# Patient Record
Sex: MALE | URBAN - METROPOLITAN AREA
[De-identification: names, ages, dates, MRNs, and addresses within clinical notes are randomized per-mention and may not be internally consistent; named-entity substitution may affect disease eponyms.]

---

## 2023-06-22 ENCOUNTER — OFFICE VISIT (OUTPATIENT)
Dept: URBAN - METROPOLITAN AREA CLINIC 26 | Facility: CLINIC | Age: 33
End: 2023-06-22
Payer: COMMERCIAL

## 2023-06-22 DIAGNOSIS — D23.10 OTHER BENIGN NEOPLASM OF SKIN OF UNSPECIFIED EYELID, INCLUDING CANTHUS: Primary | ICD-10-CM

## 2023-06-22 PROCEDURE — 92004 COMPRE OPH EXAM NEW PT 1/>: CPT | Performed by: OPTOMETRIST

## 2023-06-22 PROCEDURE — 92134 CPTRZ OPH DX IMG PST SGM RTA: CPT | Performed by: OPTOMETRIST

## 2023-06-22 ASSESSMENT — VISUAL ACUITY
OD: 20/20
OS: 20/20

## 2023-06-22 ASSESSMENT — KERATOMETRY
OD: 43.38
OS: 43.63

## 2023-06-22 ASSESSMENT — INTRAOCULAR PRESSURE
OS: 12
OD: 17

## 2023-06-22 NOTE — IMPRESSION/PLAN
Impression: Other benign neoplasm of skin of unspecified eyelid, including canthus: D23.10. 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 bothersome to pt. will schedule pt to see Dr. Fani Wiggins or oculoplastics for potential removal, next available.

## 2023-06-27 ENCOUNTER — OFFICE VISIT (OUTPATIENT)
Dept: URBAN - METROPOLITAN AREA CLINIC 30 | Facility: CLINIC | Age: 33
End: 2023-06-27
Payer: COMMERCIAL

## 2023-06-27 DIAGNOSIS — D23.10 OTHER BENIGN NEOPLASM OF SKIN OF UNSPECIFIED EYELID, INCLUDING CANTHUS: Primary | ICD-10-CM

## 2023-06-27 PROCEDURE — 92285 EXTERNAL OCULAR PHOTOGRAPHY: CPT | Performed by: OPHTHALMOLOGY

## 2023-06-27 PROCEDURE — 99204 OFFICE O/P NEW MOD 45 MIN: CPT | Performed by: OPHTHALMOLOGY

## 2023-06-27 NOTE — IMPRESSION/PLAN
Impression: Other benign neoplasm of skin of unspecified eyelid, including canthus: D23.10. Plan: Discussed with patient the risks, benefits, and alternatives to surgery. Patient consents to proceed with surgery. 
Schedule LLL lesion excision in [facility]